# Patient Record
Sex: MALE | Race: BLACK OR AFRICAN AMERICAN | NOT HISPANIC OR LATINO
[De-identification: names, ages, dates, MRNs, and addresses within clinical notes are randomized per-mention and may not be internally consistent; named-entity substitution may affect disease eponyms.]

---

## 2018-03-01 ENCOUNTER — APPOINTMENT (OUTPATIENT)
Dept: ORTHOPEDIC SURGERY | Facility: CLINIC | Age: 43
End: 2018-03-01
Payer: COMMERCIAL

## 2018-03-01 VITALS
DIASTOLIC BLOOD PRESSURE: 88 MMHG | SYSTOLIC BLOOD PRESSURE: 132 MMHG | HEIGHT: 73 IN | WEIGHT: 304 LBS | BODY MASS INDEX: 40.29 KG/M2 | HEART RATE: 80 BPM

## 2018-03-01 DIAGNOSIS — Z86.79 PERSONAL HISTORY OF OTHER DISEASES OF THE CIRCULATORY SYSTEM: ICD-10-CM

## 2018-03-01 DIAGNOSIS — Z87.09 PERSONAL HISTORY OF OTHER DISEASES OF THE RESPIRATORY SYSTEM: ICD-10-CM

## 2018-03-01 DIAGNOSIS — Z78.9 OTHER SPECIFIED HEALTH STATUS: ICD-10-CM

## 2018-03-01 DIAGNOSIS — Z86.39 PERSONAL HISTORY OF OTHER ENDOCRINE, NUTRITIONAL AND METABOLIC DISEASE: ICD-10-CM

## 2018-03-01 DIAGNOSIS — Z80.9 FAMILY HISTORY OF MALIGNANT NEOPLASM, UNSPECIFIED: ICD-10-CM

## 2018-03-01 PROCEDURE — 72100 X-RAY EXAM L-S SPINE 2/3 VWS: CPT

## 2018-03-01 PROCEDURE — 99204 OFFICE O/P NEW MOD 45 MIN: CPT

## 2018-03-01 RX ORDER — DAPAGLIFLOZIN 10 MG/1
10 TABLET, FILM COATED ORAL
Qty: 30 | Refills: 0 | Status: ACTIVE | COMMUNITY
Start: 2018-02-15

## 2018-03-01 RX ORDER — BLOOD-GLUCOSE METER
W/DEVICE KIT MISCELLANEOUS
Qty: 1 | Refills: 0 | Status: ACTIVE | COMMUNITY
Start: 2018-01-09

## 2018-03-01 RX ORDER — LANCETS 28 GAUGE
EACH MISCELLANEOUS
Qty: 100 | Refills: 0 | Status: ACTIVE | COMMUNITY
Start: 2018-01-09

## 2018-03-01 RX ORDER — SITAGLIPTIN AND METFORMIN HYDROCHLORIDE 50; 1000 MG/1; MG/1
50-1000 TABLET, FILM COATED ORAL
Qty: 60 | Refills: 0 | Status: ACTIVE | COMMUNITY
Start: 2018-01-09

## 2018-03-01 RX ORDER — NIFEDIPINE 60 MG/1
60 TABLET, FILM COATED, EXTENDED RELEASE ORAL
Qty: 90 | Refills: 0 | Status: ACTIVE | COMMUNITY
Start: 2017-04-19

## 2018-03-01 RX ORDER — CYCLOBENZAPRINE HYDROCHLORIDE 10 MG/1
10 TABLET, FILM COATED ORAL
Qty: 30 | Refills: 0 | Status: ACTIVE | COMMUNITY
Start: 2018-02-14

## 2018-03-01 RX ORDER — LOSARTAN POTASSIUM 100 MG/1
100 TABLET, FILM COATED ORAL
Qty: 90 | Refills: 0 | Status: ACTIVE | COMMUNITY
Start: 2017-04-19

## 2018-03-01 RX ORDER — BLOOD SUGAR DIAGNOSTIC
STRIP MISCELLANEOUS
Qty: 100 | Refills: 0 | Status: ACTIVE | COMMUNITY
Start: 2018-01-09

## 2018-03-01 RX ORDER — METFORMIN ER 500 MG 500 MG/1
500 TABLET ORAL
Qty: 90 | Refills: 0 | Status: ACTIVE | COMMUNITY
Start: 2017-04-19

## 2018-03-01 RX ORDER — ROSUVASTATIN CALCIUM 10 MG/1
10 TABLET, FILM COATED ORAL
Qty: 90 | Refills: 0 | Status: ACTIVE | COMMUNITY
Start: 2017-10-16

## 2018-03-01 RX ORDER — NAPROXEN 500 MG/1
500 TABLET ORAL
Qty: 60 | Refills: 0 | Status: ACTIVE | COMMUNITY
Start: 2018-02-14

## 2021-04-02 ENCOUNTER — OUTPATIENT (OUTPATIENT)
Dept: OUTPATIENT SERVICES | Facility: HOSPITAL | Age: 46
LOS: 1 days | End: 2021-04-02
Payer: COMMERCIAL

## 2021-04-02 DIAGNOSIS — R06.02 SHORTNESS OF BREATH: ICD-10-CM

## 2021-04-02 DIAGNOSIS — Z98.89 OTHER SPECIFIED POSTPROCEDURAL STATES: Chronic | ICD-10-CM

## 2021-04-02 PROCEDURE — A9500: CPT

## 2021-04-02 PROCEDURE — 78452 HT MUSCLE IMAGE SPECT MULT: CPT | Mod: 26

## 2021-04-02 PROCEDURE — 93018 CV STRESS TEST I&R ONLY: CPT

## 2021-04-02 PROCEDURE — 78452 HT MUSCLE IMAGE SPECT MULT: CPT

## 2021-04-02 PROCEDURE — 93016 CV STRESS TEST SUPVJ ONLY: CPT

## 2021-04-02 PROCEDURE — 93017 CV STRESS TEST TRACING ONLY: CPT

## 2023-02-17 ENCOUNTER — APPOINTMENT (OUTPATIENT)
Dept: NEUROSURGERY | Facility: CLINIC | Age: 48
End: 2023-02-17
Payer: COMMERCIAL

## 2023-02-17 VITALS
TEMPERATURE: 96.1 F | SYSTOLIC BLOOD PRESSURE: 140 MMHG | WEIGHT: 303 LBS | BODY MASS INDEX: 40.16 KG/M2 | HEIGHT: 73 IN | DIASTOLIC BLOOD PRESSURE: 90 MMHG

## 2023-02-17 PROCEDURE — 99204 OFFICE O/P NEW MOD 45 MIN: CPT

## 2023-02-17 NOTE — ASSESSMENT
[FreeTextEntry1] : 47-year-old male with worsening low back pain right side of which is severe and debilitating.  At this time he is taking anti-inflammatories muscle x-rays not improved.  He states that the pain is too severe for physical therapy.  At this time I recommend an MRI lumbar spine.  In the meantime he could attempt physical therapy.  Can follow-up when completed for further neurosurgical consultation.  I have answered the patient's questions and he satisfied with the visit.  The meantime I will continue with all medications as prescribed by the emergency room doctors

## 2023-02-17 NOTE — HISTORY OF PRESENT ILLNESS
[FreeTextEntry1] : Right-sided low back pain [de-identified] : This is a 47-year-old male who complains of chronic but worsening over the past year of right-sided low back pain causes sporadic spasms in his back denies any numbness tingling weakness or bladder bowel dysfunction.  The pain is severe is intermittent but when it occurs it is extremely sharp.  Is worse with sitting.  Denies any incontinence.  He had a recent visit to [facility] and he was given medications prednisone ibuprofen cyclobenzaprine oxycodone and a result of a CAT scan of the lumbar spine showed facet disease without any other acute findings.  He was also found to have nonobstructive kidney stones and an umbilical hernia.  He has not had recent physical therapy or chiropractic care pain management or previous spine surgery.  Unfortunately does not have the CD for my review today of the CAT scan only has reports

## 2023-02-17 NOTE — REASON FOR VISIT
[New Patient Visit] : a new patient visit [Referred By: _________] : Patient was referred by KALYANI [FreeTextEntry1] : Severe Lower back pain- [facility]

## 2023-04-25 ENCOUNTER — APPOINTMENT (OUTPATIENT)
Dept: NEUROSURGERY | Facility: CLINIC | Age: 48
End: 2023-04-25
Payer: COMMERCIAL

## 2023-04-25 VITALS
HEART RATE: 106 BPM | DIASTOLIC BLOOD PRESSURE: 78 MMHG | HEIGHT: 73 IN | BODY MASS INDEX: 40.16 KG/M2 | WEIGHT: 303 LBS | SYSTOLIC BLOOD PRESSURE: 111 MMHG

## 2023-04-25 DIAGNOSIS — M54.50 LOW BACK PAIN, UNSPECIFIED: ICD-10-CM

## 2023-04-25 PROCEDURE — 99213 OFFICE O/P EST LOW 20 MIN: CPT

## 2023-04-25 NOTE — HISTORY OF PRESENT ILLNESS
[FreeTextEntry1] : Right-sided low back pain [de-identified] : This is a 47-year-old male who complains of chronic but worsening over the past year of right-sided low back pain causes sporadic spasms in his back denies any numbness tingling weakness or bladder bowel dysfunction.  The pain is severe is intermittent but when it occurs it is extremely sharp.  Is worse with sitting.  Denies any incontinence.  He had a recent visit to [facility] and he was given medications prednisone ibuprofen cyclobenzaprine oxycodone and a result of a CAT scan of the lumbar spine showed facet disease without any other acute findings.  He was also found to have nonobstructive kidney stones and an umbilical hernia.  He has not had recent physical therapy or chiropractic care pain management or previous spine surgery.  Unfortunately does not have the CD for my review today of the CAT scan only has reports\par \par \par returns today for fu 4/25 after recent mri \par no relief with pt or otc medication

## 2023-04-25 NOTE — REASON FOR VISIT
[Follow-Up: _____] : a [unfilled] follow-up visit [New Patient Visit] : a new patient visit [Referred By: _________] : Patient was referred by KALYANI [FreeTextEntry1] : Severe Lower back pain- [facility]

## 2023-04-25 NOTE — ASSESSMENT
[FreeTextEntry1] : 47-year-old male with worsening low back pain right side of which is severe and debilitating.  \par Mri does not clearly explain his symptoms \par could consider pain mgmt\par could consider GI or general surgery workup\par no neurosurgical intervention\par \par darryl lynne

## 2024-06-24 ENCOUNTER — APPOINTMENT (OUTPATIENT)
Dept: MRI IMAGING | Facility: CLINIC | Age: 49
End: 2024-06-24
Payer: COMMERCIAL

## 2024-06-24 PROCEDURE — 73721 MRI JNT OF LWR EXTRE W/O DYE: CPT | Mod: RT
